# Patient Record
Sex: FEMALE
[De-identification: names, ages, dates, MRNs, and addresses within clinical notes are randomized per-mention and may not be internally consistent; named-entity substitution may affect disease eponyms.]

---

## 2018-04-22 ENCOUNTER — HOSPITAL ENCOUNTER (EMERGENCY)
Dept: HOSPITAL 97 - ER | Age: 58
Discharge: HOME | End: 2018-04-22
Payer: SELF-PAY

## 2018-04-22 DIAGNOSIS — N39.0: Primary | ICD-10-CM

## 2018-04-22 DIAGNOSIS — Z88.0: ICD-10-CM

## 2018-04-22 LAB — UA COMPLETE W REFLEX CULTURE PNL UR: (no result)

## 2018-04-22 PROCEDURE — 96372 THER/PROPH/DIAG INJ SC/IM: CPT

## 2018-04-22 PROCEDURE — 81003 URINALYSIS AUTO W/O SCOPE: CPT

## 2018-04-22 PROCEDURE — 81015 MICROSCOPIC EXAM OF URINE: CPT

## 2018-04-22 PROCEDURE — 87086 URINE CULTURE/COLONY COUNT: CPT

## 2018-04-22 PROCEDURE — 87186 SC STD MICRODIL/AGAR DIL: CPT

## 2018-04-22 PROCEDURE — 87077 CULTURE AEROBIC IDENTIFY: CPT

## 2018-04-22 PROCEDURE — 81025 URINE PREGNANCY TEST: CPT

## 2018-04-22 PROCEDURE — 99283 EMERGENCY DEPT VISIT LOW MDM: CPT

## 2018-04-22 PROCEDURE — 87088 URINE BACTERIA CULTURE: CPT

## 2018-04-22 NOTE — EDPHYS
Physician Documentation                                                                           

 [facility]                                                                

Name: Jennifer Noble                                                                                  

Age: 58 yrs                                                                                       

Sex: Female                                                                                       

: 1960                                                                                   

MRN: [id number]                                                                                   

Arrival Date: 2018                                                                          

Time: 19:54                                                                                       

Account#: [id number]                                                                            

Bed 20                                                                                            

Private MD:                                                                                       

ED Physician Bahman Huang                                                                     

HPI:                                                                                              

                                                                                             

21:35 This 58 yrs old  Female presents to ER via Ambulatory with complaints of Flank  kb  

      Pain, Pain With Urination.                                                                  

21:35 The patient presents with urinary symptoms, dysuria, frequency. Onset: The              kb  

      symptoms/episode began/occurred this morning. Modifying factors: The symptoms are           

      alleviated by nothing, the symptoms are aggravated by urinating. Associated signs and       

      symptoms: Pertinent positives: dysuria, urinary frequency. Severity of symptoms: At         

      their worst the symptoms were moderate, in the emergency department the symptoms are        

      unchanged. The patient has not experienced similar symptoms in the past. The patient        

      has not recently seen a physician.                                                          

                                                                                                  

Historical:                                                                                       

- Allergies:                                                                                      

19:59 PENICILLINS;                                                                            la1 

- PMHx:                                                                                           

19:59 None;                                                                                   la1 

                                                                                                  

- Immunization history:: Adult Immunizations up to date.                                          

- Social history:: Smoking status: Patient/guardian denies using tobacco.                         

                                                                                                  

                                                                                                  

ROS:                                                                                              

21:47  Positive for urinary symptoms, flank pain, urinary frequency, burning with urination.kb  

21:47 Constitutional: Negative for fever, chills, and weight loss, Cardiovascular: Negative       

      for chest pain, palpitations, and edema, Respiratory: Negative for shortness of breath,     

      cough, wheezing, and pleuritic chest pain, Abdomen/GI: Negative for abdominal pain,         

      nausea, vomiting, diarrhea, and constipation, MS/Extremity: Negative for injury and         

      deformity, Skin: Negative for injury, rash, and discoloration, Neuro: Negative for          

      headache, weakness, numbness, tingling, and seizure.                                        

                                                                                                  

Exam:                                                                                             

21:47 Constitutional:  This is a well developed, well nourished patient who is awake, alert,  kb  

      and in no acute distress. Head/Face:  Normocephalic, atraumatic. Chest/axilla:  Normal      

      chest wall appearance and motion.  Nontender with no deformity.  No lesions are             

      appreciated. Cardiovascular:  Regular rate and rhythm with a normal S1 and S2.  No          

      gallops, murmurs, or rubs.  Normal PMI, no JVD.  No pulse deficits. Respiratory:  Lungs     

      have equal breath sounds bilaterally, clear to auscultation and percussion.  No rales,      

      rhonchi or wheezes noted.  No increased work of breathing, no retractions or nasal          

      flaring. Abdomen/GI:  Soft, non-tender, with normal bowel sounds.  No distension or         

      tympany.  No guarding or rebound.  No evidence of tenderness throughout. Back:  No          

      spinal tenderness.  No costovertebral tenderness.  Full range of motion. Skin:  Warm,       

      dry with normal turgor.  Normal color with no rashes, no lesions, and no evidence of        

      cellulitis. MS/ Extremity:  Pulses equal, no cyanosis.  Neurovascular intact.  Full,        

      normal range of motion. Neuro:  Awake and alert, GCS 15, oriented to person, place,         

      time, and situation.  Cranial nerves II-XII grossly intact.  Motor strength 5/5 in all      

      extremities.  Sensory grossly intact.  Cerebellar exam normal.  Normal gait.                

                                                                                                  

Vital Signs:                                                                                      

19:59  / 86; Pulse 90; Resp 19; Temp 98.5; Pulse Ox 100% on R/A; Weight 71.21 kg;       la1 

21:17  / 90; Pulse 85; Resp 18; Pulse Ox 95% on R/A;                                    ao  

                                                                                                  

MDM:                                                                                              

20:15 Patient medically screened.                                                             kb  

21:18 Data reviewed: vital signs, nurses notes. Data interpreted: Pulse oximetry: on room air kb  

      is 95 %. Interpretation: normal. Counseling: I had a detailed discussion with the           

      patient and/or guardian regarding: the historical points, exam findings, and any            

      diagnostic results supporting the discharge/admit diagnosis, lab results, the need for      

      outpatient follow up, a family practitioner, to return to the emergency department if       

      symptoms worsen or persist or if there are any questions or concerns that arise at home.    

                                                                                                  

                                                                                             

20:31 Order name: Urine Microscopic Only                                                      kb  

                                                                                             

20:42 Order name: Urine Dipstick--Ancillary (enter results); Complete Time: 20:54             em1 

                                                                                             

20:31 Order name: Urine Dipstick-Ancillary (obtain specimen); Complete Time: 20:36            kb  

                                                                                             

20:42 Order name: Urine Pregnancy--Ancillary (enter results); Complete Time: 20:54            em1 

                                                                                                  

Administered Medications:                                                                         

21:05 Drug: TORadol 60 mg Route: IM; Site: left deltoid;                                      ao  

21:33 Follow up: Response: No adverse reaction                                                ao  

21:05 Drug: Macrobid 100 mg Route: PO;                                                        ao  

21:34 Follow up: Response: No adverse reaction                                                ao  

                                                                                                  

                                                                                                  

Disposition:                                                                                      

                                                                                             

02:29 Co-signature as Attending Physician, Bahman Huang MD I agree with the assessment and tw4 

      plan of care.                                                                               

                                                                                                  

Disposition:                                                                                      

18 21:19 Discharged to Home. Impression: Urinary tract infection, site not specified.       

- Condition is Stable.                                                                            

- Discharge Instructions: Urinary Tract Infection, Easy-to-Read.                                  

- Prescriptions for Macrobid 100 mg Oral Capsule - take 1 capsule by ORAL route every             

  12 hours for 10 days; 20 capsule.                                                               

- Medication Reconciliation Form, Thank You Letter, Antibiotic Education, Prescription            

  Opioid Use form.                                                                                

- Follow up: Emergency Department; When: As needed; Reason: Worsening of condition.               

  Follow up: Private Physician; When: 2 - 3 days; Reason: Recheck today's complaints,             

  Continuance of care, Re-evaluation by your physician.                                           

                                                                                                  

                                                                                                  

                                                                                                  

Signatures:                                                                                       

Dispatcher [employer]                           Yudelka Heredia, YUE-C                 FNP-Emanuel Wilkerson RN RN   la1                                                  

Benito Ritter, RN                         Bahman Dale MD MD   tw4                                                  

                                                                                                  

**************************************************************************************************

## 2018-04-22 NOTE — ER
Nurse's Notes                                                                                     

 [facility]                                                                

Name: Jennifer Noble                                                                                  

Age: 58 yrs                                                                                       

Sex: Female                                                                                       

: 1960                                                                                   

MRN: [id number]                                                                                   

Arrival Date: 2018                                                                          

Time: 19:54                                                                                       

Account#: [id number]                                                                            

Bed 20                                                                                            

Private MD:                                                                                       

Diagnosis: Urinary tract infection, site not specified                                            

                                                                                                  

Presentation:                                                                                     

                                                                                             

19:58 Presenting complaint: Patient states: left flank pain and burning with urination since  la1 

      yesterday. Transition of care: patient was not received from another setting of care.       

      Onset of symptoms was 2018. Initial Sepsis Screen: Does the patient meet any      

      2 criteria? No. Patient's initial sepsis screen is negative. Does the patient have a        

      suspected source of infection? No. Patient's initial sepsis screen is negative. Care        

      prior to arrival: None.                                                                     

19:58 Method Of Arrival: Ambulatory                                                           la1 

19:58 Acuity: POLO 3                                                                           la1 

                                                                                                  

Historical:                                                                                       

- Allergies:                                                                                      

19:59 PENICILLINS;                                                                            la1 

- PMHx:                                                                                           

19:59 None;                                                                                   la1 

                                                                                                  

- Immunization history:: Adult Immunizations up to date.                                          

- Social history:: Smoking status: Patient/guardian denies using tobacco.                         

                                                                                                  

                                                                                                  

Screenin:36 Abuse screen: Denies threats or abuse. Denies injuries from another. Nutritional        ao  

      screening: No deficits noted. Tuberculosis screening: No symptoms or risk factors           

      identified. Fall Risk None identified.                                                      

                                                                                                  

Assessment:                                                                                       

20:32 General: Appears in no apparent distress. comfortable, Behavior is calm, cooperative,   ao  

      appropriate for age. Pain: Complains of pain in back Pain does not radiate. Neuro:          

      Level of Consciousness is awake, alert, obeys commands, Oriented to person, place,          

      time, situation, Appropriate for age Moves all extremities. Speech is normal, Facial        

      symmetry appears normal, Pupils are PERRLA. Cardiovascular: Denies chest pain,              

      shortness of breath, Capillary refill < 3 seconds Patient's skin is warm and dry.           

      Respiratory: Airway is patent Respiratory effort is even, unlabored, Respiratory            

      pattern is regular, symmetrical, Breath sounds are clear bilaterally. GI: Abdomen is        

      non-distended. : No signs and/or symptoms were reported regarding the genitourinary       

      system. EENT: No signs and/or symptoms were reported regarding the EENT system. Derm:       

      Skin is pink, warm \T\ dry. Skin temperature is warm. Musculoskeletal: Capillary refill.    

21:12 Reassessment: Patient appears in no apparent distress at this time. No changes from     ao  

      previously documented assessment. Patient and/or family updated on plan of care and         

      expected duration. Pain level reassessed. Patient is alert, oriented x 3, equal             

      unlabored respirations, skin warm/dry/pink.                                                 

                                                                                                  

Vital Signs:                                                                                      

19:59  / 86; Pulse 90; Resp 19; Temp 98.5; Pulse Ox 100% on R/A; Weight 71.21 kg;       la1 

21:17  / 90; Pulse 85; Resp 18; Pulse Ox 95% on R/A;                                    ao  

                                                                                                  

ED Course:                                                                                        

19:54 Patient arrived in ED.                                                                  es  

19:58 Triage completed.                                                                       la1 

19:59 Arm band placed on right wrist.                                                         la1 

20:15 [facility] Berger [facility]C is [facility]                                                        kb  

20:15 Bahman Huang MD is Attending Physician.                                            kb  

20:22 Benito Ritter, RN is Primary Nurse.                                                       ao  

20:39 Patient has correct armband on for positive identification. Pulse ox on. NIBP on.       ao  

21:32 No provider procedures requiring assistance completed. Patient did not have IV access   ao  

      during this emergency room visit.                                                           

                                                                                                  

Administered Medications:                                                                         

21:05 Drug: TORadol 60 mg Route: IM; Site: left deltoid;                                      ao  

21:33 Follow up: Response: No adverse reaction                                                ao  

21:05 Drug: Macrobid 100 mg Route: PO;                                                        ao  

21:34 Follow up: Response: No adverse reaction                                                ao  

                                                                                                  

                                                                                                  

Outcome:                                                                                          

21:19 Discharge ordered by MD.                                                                kb  

21:32 Discharged to home ambulatory.                                                          ao  

21:32 Condition: stable                                                                           

21:32 Discharge instructions given to patient, family, Instructed on discharge instructions,      

      follow up and referral plans. Demonstrated understanding of instructions, follow-up         

      care, medications, Prescriptions given X 1.                                                 

21:33 Patient left the ED.                                                                    ao  

                                                                                                  

Addendum:                                                                                         

2018                                                                                        

     08:48 Addendum: Culture Results: Positive urine culture. No further action required. Bacteria s
s

           sensitive to prescribed antibiotic.                                                    

                                                                                                  

Signatures:                                                                                       

Yudelka Berger FNP-C FNP-Anita Francois Shelby, RN RN ss Attema, Lee, RN RN   la1                                                  

Benito Ritter RN RN   ao                                                   

                                                                                                  

**************************************************************************************************